# Patient Record
Sex: FEMALE | Race: BLACK OR AFRICAN AMERICAN | Employment: UNEMPLOYED | URBAN - NONMETROPOLITAN AREA
[De-identification: names, ages, dates, MRNs, and addresses within clinical notes are randomized per-mention and may not be internally consistent; named-entity substitution may affect disease eponyms.]

---

## 2021-08-05 ENCOUNTER — HOSPITAL ENCOUNTER (OUTPATIENT)
Dept: PHYSICAL THERAPY | Age: 26
Setting detail: THERAPIES SERIES
Discharge: HOME | End: 2021-08-05
Payer: COMMERCIAL

## 2021-08-05 PROCEDURE — 97110 THERAPEUTIC EXERCISES: CPT

## 2021-08-05 PROCEDURE — 97162 PT EVAL MOD COMPLEX 30 MIN: CPT

## 2021-08-05 ASSESSMENT — PAIN DESCRIPTION - LOCATION: LOCATION: BACK

## 2021-08-05 ASSESSMENT — PAIN DESCRIPTION - PAIN TYPE: TYPE: CHRONIC PAIN

## 2021-08-05 ASSESSMENT — PAIN DESCRIPTION - ORIENTATION: ORIENTATION: MID;LOWER;UPPER

## 2021-08-05 ASSESSMENT — PAIN DESCRIPTION - PROGRESSION: CLINICAL_PROGRESSION: NOT CHANGED

## 2021-08-05 ASSESSMENT — PAIN DESCRIPTION - FREQUENCY: FREQUENCY: CONTINUOUS

## 2021-08-05 ASSESSMENT — PAIN - FUNCTIONAL ASSESSMENT: PAIN_FUNCTIONAL_ASSESSMENT: ACTIVITIES ARE NOT PREVENTED

## 2021-08-05 ASSESSMENT — PAIN SCALES - GENERAL: PAINLEVEL_OUTOF10: 4

## 2021-08-05 NOTE — PLAN OF CARE
Outpatient Physical Therapy           [place]           [] Phone: [phone]   Fax: [phone]  [place]           [x] Phone: [phone]   Fax: [phone]     To: Referring Practitioner: Gladis Kam    From: Ricardo Fiore PT, PT     Patient: Natacha Stout       : 1995  Diagnosis: Diagnosis: Back pain   Treatment Diagnosis: Treatment Diagnosis: chronic back pain   Date: 2021    Physical Therapy Certification/Re-Certification Form    The following patient has been evaluated for physical therapy services and for therapy to continue, insurance requires physician review of the treatment plan initially and every 90 days. Please review the attached evaluation and/or summary of the patient's plan of care, and verify that you agree therapy should continue by signing the attached document and sending it back to our office. Assessment:    Patient primary complaints: Chronic back pain  History of condition:ongoing mid/low back pain for past 2 years; works in [employer] - pain @ end of day working- able to sleep well - sleeps on sides;  Current functional limitations:  difficulty with standing long periods; difficulty with bending over; pain decreases with tylenol, sitting down  Clinical findings:10 STS in 30 sec- reported knee pain/ Mod Oswestry 8/50;LLE General PROM: hip ROM limited by LBP; Lumbar: FB mid shin- hamstring tightness, curve does not reverse; RSB distal thigh - pulls on L side; L SB to proximal knee pulls on R side; BB full ROM   Strength RLE: WFL R Hip Extension: 4/5 (painful); Strength LLE: WFL L Hip Extension: 4/5 (painful);   Special Tests: L slump test painful in L leg/low back  PLOF:back pain has impacted on function for past 2 years  Skilled PT interventions are intended to:decrease LBP and establish to prevent pain flare ups which will enable patient  to improve quality of life  Patient agrees with established plan of care and assisted in the development of their goals  Barriers to learning:none- no mental/cognitive barriers observed  Preferred learning style(s):   written- demonstration -practice  Preferred Language: English  Potential barriers to progress:none  The patient appears motivated to participate in PT : yes  Plan of Care/Treatment to date:  [x] Therapeutic Exercise  [] Modalities:  [x] Therapeutic Activity     [] Ultrasound  [] Electrical Stimulation  [] Gait Training      [] Cervical Traction [] Lumbar Traction  [x] Neuromuscular Re-education    [] Cold/hotpack [] Iontophoresis   [x] Instruction in HEP      [] Vasopneumatic    [] Dry Needling  [x] Manual Therapy               [] Aquatic Therapy     Frequency/Duration:  # Days per week: [x] 1 day # Weeks: [] 1 week [] 5 weeks     [x] 2 days   [] 2 weeks [x] 6 weeks     [] 3 days   [] 3 weeks [] 7 weeks     [] 4 days   [] 4 weeks [] 8 weeks         [] 9 weeks [] 10 weeks         [] 11 weeks [] 12 weeks  Rehab Potential/Progress: [] Excellent [x] Good [] Fair  [] Poor   Goals:    Patient goals : less back pain     Long term goals  Time Frame for Long term goals : 6 weeks 9/15/21  Long term goal 1: patient will score 3/50 on Mod Oswestry  Long term goal 2: patient will be independent with HEP  Electronically signed by:  Ricardo Fiore, PT, PT, 8/5/2021, 3:17 PM  If you have any questions or concerns, please don't hesitate to call.   Thank you for your referral.      Physician Signature:________________________________Date:_________ TIME: _____  By signing above, therapists plan is approved by physician

## 2021-08-05 NOTE — PROGRESS NOTES
Physical Therapy  Initial Assessment  Date: 2021  Patient Name: Jose A Silva  MRN: [id number]  : 1995     Treatment Diagnosis: chronic back pain    Restrictions  Position Activity Restriction  Other position/activity restrictions: none    Subjective   General  Chart Reviewed: Yes  Patient assessed for rehabilitation services?: Yes  Additional Pertinent Hx: herniated lumbar disc ~ 9 years ago; L knee inflammation  Family / Caregiver Present: No  Referring Practitioner: Smiley Mclean  Diagnosis: Back pain  Follows Commands: Within Functional Limits  PT Visit Information  PT Insurance Information: [employer] - needs pre cert  Subjective  Subjective: ongoing mid/low back pain for past 2 years; works in [employer] - pain @ end of day working- able to sleep well - sleeps on sides; difficulty with standing long periods; difficulty with bending over; pain decreases with tylenol, sitting down  Pain Screening  Patient Currently in Pain: Yes  Pain Assessment  Pain Assessment: 0-10  Pain Level: 4 (max pain 7/10)  Pain Type: Chronic pain  Pain Location: Back  Pain Orientation: Mid;Lower; Upper  Pain Frequency: Continuous  Clinical Progression: Not changed  Functional Pain Assessment: Activities are not prevented  Vital Signs  Patient Currently in Pain: Yes    Vision/Hearing  Vision  Vision: Within Functional Limits  Hearing  Hearing: Within functional limits    Orientation  Orientation  Overall Orientation Status: Within Normal Limits    Social/Functional History  Social/Functional History  Lives With: Alone  Type of Home: Mobile home  Home Layout: One level  Home Access: Level entry  ADL Assistance: Independent  Homemaking Assistance: Independent  Homemaking Responsibilities: Yes  Ambulation Assistance: Independent  Transfer Assistance: Independent  Active : Yes  Mode of Transportation: Car  Occupation: Full time employment  Type of occupation: lawn mowing    Objective          AROM RLE (degrees)  RLE AROM: WFL  PROM LLE (degrees)  LLE PROM: WFL  LLE General PROM: hip ROM limited by LBP  Spine  Lumbar: FB mid shin- hamstring tightness, curve does not reverse; RSB distal thigh - pulls on L side; L SB to proximal knee pulls on R side; BB full ROM    Strength RLE  Strength RLE: WFL  R Hip Extension: 4/5 (painful)  Strength LLE  Strength LLE: WFL  L Hip Extension: 4/5 (painful)     Additional Measures  Special Tests: L slump test painful in L leg/low back                                             Assessment   Conditions Requiring Skilled Therapeutic Intervention  Body structures, Functions, Activity limitations: Increased pain;Decreased high-level IADLs  Assessment: 10 STS in 30 sec- reported knee pain/ Mod Oswestry 8/50  Treatment Diagnosis: chronic back pain  Prognosis: Good  Decision Making: Medium Complexity  History: PF- chronic back pain  Exam: STS/ROM/ Mod Oswestry  Clinical Presentation: changing characteristics of function due to pain  Barriers to Learning: none  REQUIRES PT FOLLOW UP: Yes         Plan        G-Code       OutComes Score                                                  AM-PAC Score             Goals  Long term goals  Time Frame for Long term goals : 6 weeks 9/15/21  Long term goal 1: patient will score 3/50 on Mod Oswestry  Long term goal 2: patient will be independent with HEP  Patient Goals   Patient goals : less back pain       Therapy Time   Individual Concurrent Group Co-treatment   Time In 0945         Time Out 1030         Minutes 45         Timed Code Treatment Minutes: 23 Minutes       SALLY Michael, PT

## 2021-08-05 NOTE — FLOWSHEET NOTE
Outpatient Physical Therapy  [place]           [] Phone: [phone]   Fax: [phone]  [place]           [x] Phone: [phone]   Fax: [phone]        Physical Therapy Daily Treatment Note  Date:  2021    Patient Name:  Sabiha Mcdonald    :  1995  MRN: [id number]  Restrictions/Precautions: Other position/activity restrictions: none  Diagnosis:   Diagnosis: Back pain  Date of Injury/Surgery:   Treatment Diagnosis: Treatment Diagnosis: chronic back pain    Insurance/Certification information: PT Insurance Information: [employer] - needs pre cert   Referring Physician:  Referring Practitioner: Becky Sigala  Next Doctor Visit:    Plan of care signed (Y/N):    Outcome Measure: 10 STS in 30 sec- reported knee pain/ Mod Oswestry 8/50  Visit# / total visits:  1 /  Pain level: 4/10  Back pain @ eval   Goals:     Patient goals : less back pain     Long term goals  Time Frame for Long term goals : 6 weeks 9/15/21  Long term goal 1: patient will score 3/50 on Mod Oswestry  Long term goal 2: patient will be independent with HEP      ASSESSMENT  Patient primary complaints: Chronic back pain  History of condition:ongoing mid/low back pain for past 2 years; works in [employer] - pain @ end of day working- able to sleep well - sleeps on sides;  Current functional limitations:  difficulty with standing long periods; difficulty with bending over; pain decreases with tylenol, sitting down  Clinical findings:10 STS in 30 sec- reported knee pain/ Mod Oswestry 8/50;LLE General PROM: hip ROM limited by LBP; Lumbar: FB mid shin- hamstring tightness, curve does not reverse; RSB distal thigh - pulls on L side; L SB to proximal knee pulls on R side; BB full ROM   Strength RLE: WFL R Hip Extension: 4/5 (painful); Strength LLE: WFL L Hip Extension: 4/5 (painful); Special Tests: L slump test painful in L leg/low back  PLOF:back pain has impacted on function for past 2 years  Skilled PT interventions are intended to:decrease LBP and establish to prevent pain flare ups which will enable patient  to improve quality of life  Patient agrees with established plan of care and assisted in the development of their  goals  Barriers to learning:none- no mental/cognitive barriers observed  Preferred learning style(s):   written- demonstration -practice  Preferred Language: English  Potential barriers to progress:none  The patient appears motivated to participate in PT : yes      Subjective:  See eval         Any changes in Ambulatory Summary Sheet? None        Objective:  See eval   COVID screening questions were asked and patient attested that there had been no contact or symptoms        Exercises: (No more than 4 columns)   Exercise/Equipment Date 8/5/21 Date Date      PT eval/ HEP instruct     WARM UP         nustep Seat 10/ arms 10 load 4 x10'           TABLE      hooklying trunk ROT x10 reps     Side ABD R/L x10 reps     Fig 4 piriformis stretch 30 ct x 3 R/L     Bridges  x10 reps              STANDING      LAE/lat rows  Trial     FM walkouts  10# x8 reps R/L                                        PROPRIOCEPTION                                    MODALITIES                      Other Therapeutic Activities/Education:        Home Exercise Program:  Trunk ROT, side ABD, fig 4 piriformis stretch, bridges- patient demonstrated and expressed understanding- provided with handout      Manual Treatments:        Modalities:        Communication with other providers:        Assessment:  (Response towards treatment session) (Pain Rating)    Pt tolerated  treatment without any adverse reactions or complications this date.  . Pt would continue to benefit from skilled therapy interventions to address remaining impairments, improve mobility and strength,  and progress toward goal completion and prepare for d/c including finalizing HEP ;  .  Pain complaints after session 4/10  Back pain    Plan for Next Session:        Time In / Time Out:   See eval              Timed Code/Total Treatment Minutes: 21' TE/ 45' includes eval      Next Progress Note due:        Plan of Care Interventions:  [x] Therapeutic Exercise  [] Modalities:  [x] Therapeutic Activity     [] Ultrasound  [] Estim  [] Gait Training      [] Cervical Traction [] Lumbar Traction  [x] Neuromuscular Re-education    [] Cold/hotpack [] Iontophoresis   [x] Instruction in HEP      [] Vasopneumatic   [] Dry Needling    [x] Manual Therapy               [] Aquatic Therapy              Electronically signed by:  Brianna Granda PT, 8/5/2021, 3:34 PM

## 2021-08-09 ENCOUNTER — APPOINTMENT (OUTPATIENT)
Dept: PHYSICAL THERAPY | Age: 26
End: 2021-08-09
Payer: COMMERCIAL

## 2021-08-11 ENCOUNTER — HOSPITAL ENCOUNTER (OUTPATIENT)
Dept: PHYSICAL THERAPY | Age: 26
Setting detail: THERAPIES SERIES
Discharge: HOME | End: 2021-08-11
Payer: COMMERCIAL

## 2021-08-11 PROCEDURE — 97110 THERAPEUTIC EXERCISES: CPT

## 2021-08-11 NOTE — FLOWSHEET NOTE
Outpatient Physical Therapy  [place]           [] Phone: [phone]   Fax: [phone]  [place]           [x] Phone: [phone]   Fax: [phone]        Physical Therapy Daily Treatment Note  Date:  2021    Patient Name:  Aiden Schultz    :  1995  MRN: [id number]  Restrictions/Precautions: Other position/activity restrictions: none  Diagnosis:   Diagnosis: Back pain  Date of Injury/Surgery:   Treatment Diagnosis: Treatment Diagnosis: chronic back pain    Insurance/Certification information: PT Insurance Information: [employer] - needs pre cert   Referring Physician:  Referring Practitioner: Real Antoni  Next Doctor Visit:    Plan of care signed (Y/N):    Outcome Measure: 10 STS in 30 sec- reported knee pain/ Mod Oswestry 8/50  Visit# / total visits:  2 /  Pain level: 6/10  Back pain and left knee    Goals:     Patient goals : less back pain     Long term goals  Time Frame for Long term goals : 6 weeks 9/15/21  Long term goal 1: patient will score 3/50 on Mod Oswestry  Long term goal 2: patient will be independent with HEP      ASSESSMENT  Patient primary complaints: Chronic back pain  History of condition:ongoing mid/low back pain for past 2 years; works in [employer] - pain @ end of day working- able to sleep well - sleeps on sides;  Current functional limitations:  difficulty with standing long periods; difficulty with bending over; pain decreases with tylenol, sitting down  Clinical findings:10 STS in 30 sec- reported knee pain/ Mod Oswestry 8/50;LLE General PROM: hip ROM limited by LBP; Lumbar: FB mid shin- hamstring tightness, curve does not reverse; RSB distal thigh - pulls on L side; L SB to proximal knee pulls on R side; BB full ROM   Strength RLE: WFL R Hip Extension: 4/5 (painful); Strength LLE: WFL L Hip Extension: 4/5 (painful); Special Tests: L slump test painful in L leg/low back  PLOF:back pain has impacted on function for past 2 years  Skilled PT interventions are intended to:decrease LBP and establish to prevent pain flare ups which will enable patient  to improve quality of life  Patient agrees with established plan of care and assisted in the development of their  goals  Barriers to learning:none- no mental/cognitive barriers observed  Preferred learning style(s):   written- demonstration -practice  Preferred Language: English  Potential barriers to progress:none  The patient appears motivated to participate in PT : yes      Subjective:  Patient states that her back is more sore today because she did a lot of lifting at work yesterday. Rates her pain 6/10. Any changes in Ambulatory Summary Sheet? None        Objective:  Poor core control noted with exercises  Patient arrived late for appointment    COVID screening questions were asked and patient attested that there had been no contact or symptoms        Exercises: (No more than 4 columns)   Exercise/Equipment Date 8/5/21 Date  8/11/2021 Date      PT eval/ HEP instruct     WARM UP         nustep Seat 10/ arms 10 load 4 x10' Seat 10/ arms 10 load 4 x10'          TABLE      hooklying trunk ROT x10 reps 10x ea     Side ABD R/L x10 reps R/L 15x    Fig 4 piriformis stretch 30 ct x 3 R/L 30\"x2 ea R/L    Bridges  x10 reps 15x              STANDING      LAE/lat rows  PTT 20x ea     FM walkouts  10# x8 reps R/L 10# 10x R/L                                       PROPRIOCEPTION                                    MODALITIES                      Other Therapeutic Activities/Education:        Home Exercise Program:  Trunk ROT, side ABD, fig 4 piriformis stretch, bridges- patient demonstrated and expressed understanding- provided with handout      Manual Treatments:        Modalities:        Communication with other providers:        Assessment:  (Response towards treatment session) (Pain Rating) Patient noted pain in the left hip at the end of treatment. Rated her pain 7/10. Progressed core strengthening exercises this visit. Plan for Next Session:        Time In / Time Out:  1100/1130       Timed Code/Total Treatment Minutes: 27' TE    If [employer] Please Indicate Units for Rx this date and running total for each and overall total for Rx to date:  CPT Code Units today Running Total Units Total approved    TE [id number]  2 2 48   MAN [id number]    36   Gait [id number]      NR [id number]   24   TA  [id number]   88   Estim Unatt [id number]       [address]      VASO [address]       ADL/Self care 20766      DNT 1-2 20560      DNT 3-4 20561      Other:                 Total for episode of care  2              Next Progress Note due:        Plan of Care Interventions:  [x] Therapeutic Exercise  [] Modalities:  [x] Therapeutic Activity     [] Ultrasound  [] Estim  [] Gait Training      [] Cervical Traction [] Lumbar Traction  [x] Neuromuscular Re-education    [] Cold/hotpack [] Iontophoresis   [x] Instruction in HEP      [] Vasopneumatic   [] Dry Needling    [x] Manual Therapy               [] Aquatic Therapy              Electronically signed by:  Jomar Her PTA  8/11/2021, 10:58 AM

## 2021-08-18 ENCOUNTER — HOSPITAL ENCOUNTER (OUTPATIENT)
Dept: PHYSICAL THERAPY | Age: 26
Setting detail: THERAPIES SERIES
Discharge: HOME | End: 2021-08-18
Payer: COMMERCIAL

## 2021-08-18 PROCEDURE — 97110 THERAPEUTIC EXERCISES: CPT

## 2021-08-18 NOTE — FLOWSHEET NOTE
Outpatient Physical Therapy  [place]           [] Phone: [phone]   Fax: [phone]  [place]           [x] Phone: [phone]   Fax: [phone]        Physical Therapy Daily Treatment Note  Date:  2021    Patient Name:  Marti Garcia    :  1995  MRN: [id number]  Restrictions/Precautions: Other position/activity restrictions: none  Diagnosis:   Diagnosis: Back pain  Date of Injury/Surgery:   Treatment Diagnosis: Treatment Diagnosis: chronic back pain    Insurance/Certification information: PT Insurance Information: [employer] - needs pre cert   Referring Physician:  Referring Practitioner: Jacklyn Cooper  Next Doctor Visit:    Plan of care signed (Y/N):    Outcome Measure: 10 STS in 30 sec- reported knee pain/ Mod Oswestry 8/50  Visit# / total visits:  3 /  Pain level: 0/10  Back pain and 6-7/10 left knee    Goals:     Patient goals : less back pain     Long term goals  Time Frame for Long term goals : 6 weeks 9/15/21  Long term goal 1: patient will score 3/50 on Mod Oswestry  Long term goal 2: patient will be independent with HEP      ASSESSMENT  Patient primary complaints: Chronic back pain  History of condition:ongoing mid/low back pain for past 2 years; works in [employer] - pain @ end of day working- able to sleep well - sleeps on sides;  Current functional limitations:  difficulty with standing long periods; difficulty with bending over; pain decreases with tylenol, sitting down  Clinical findings:10 STS in 30 sec- reported knee pain/ Mod Oswestry 8/50;LLE General PROM: hip ROM limited by LBP; Lumbar: FB mid shin- hamstring tightness, curve does not reverse; RSB distal thigh - pulls on L side; L SB to proximal knee pulls on R side; BB full ROM   Strength RLE: WFL R Hip Extension: 4/5 (painful); Strength LLE: WFL L Hip Extension: 4/5 (painful); Special Tests: L slump test painful in L leg/low back  PLOF:back pain has impacted on function for past 2 years  Skilled PT interventions are intended to:decrease LBP and establish to prevent pain flare ups which will enable patient  to improve quality of life  Patient agrees with established plan of care and assisted in the development of their  goals  Barriers to learning:none- no mental/cognitive barriers observed  Preferred learning style(s):   written- demonstration -practice  Preferred Language: English  Potential barriers to progress:none  The patient appears motivated to participate in PT : yes      Subjective:  States that she has not had any back pain in about a week. Left knee is still bothering her. Rates her knee pain 6-7/10. Any changes in Ambulatory Summary Sheet?   None        Objective: Patient demonstrated increased tightness in B HS  COVID screening questions were asked and patient attested that there had been no contact or symptoms        Exercises: (No more than 4 columns)   Exercise/Equipment Date 8/5/21 Date  8/11/2021 Date  8/18/2021      PT eval/ HEP instruct     WARM UP         nustep Seat 10/ arms 10 load 4 x10' Seat 10/ arms 10 load 4 x10' Seat 10/ arms 10 load 4 x10'         TABLE      hooklying trunk ROT x10 reps 10x ea  10x5\" ea   HS Stretch   30\"x3 B with green strap   Side ABD R/L x10 reps R/L 15x RTB 10x B   Fig 4 piriformis stretch 30 ct x 3 R/L 30\"x2 ea R/L 30\"x2 ea    Bridges  x10 reps 15x  20x3\"   Clamshells   RTB 10x    KTC with feet on ball   20x   TA contractions with Marching   GTB UE resistance 20x    TA contraction with Arms     GTB 20x   STANDING      LAE/lat rows  PTT 20x ea  PTT 20x ea    FM walkouts  10# x8 reps R/L 10# 10x R/L 10# 10x R/L                                      PROPRIOCEPTION                                    MODALITIES                      Other Therapeutic Activities/Education:        Home Exercise Program:  Trunk ROT, side ABD, fig 4 piriformis stretch, bridges- patient demonstrated and expressed understanding- provided with handout      Manual Treatments:        Modalities: Communication with other providers:        Assessment:  (Response towards treatment session) (Pain Rating) Patient rated her back and knee pain 6/10 after treatment. Progressed core strengthening and flexibility exercises today. Plan for Next Session:        Time In / Time Out:  1257/1335     Timed Code/Total Treatment Minutes: 45' TE    If [employer] Please Indicate Units for Rx this date and running total for each and overall total for Rx to date:  CPT Code Units today Running Total Units Total approved    TE [id number]  3 5 48   MAN [id number]    36   Gait 93887      NR [id number]   24   TA  57355   19   Estim Unatt [id number]       [address] [address]       ADL/Self care 70257      DNT 1-2 12615      DNT 3-4 20561      Other:                 Total for episode of care  2              Next Progress Note due:        Plan of Care Interventions:  [x] Therapeutic Exercise  [] Modalities:  [x] Therapeutic Activity     [] Ultrasound  [] Estim  [] Gait Training      [] Cervical Traction [] Lumbar Traction  [x] Neuromuscular Re-education    [] Cold/hotpack [] Iontophoresis   [x] Instruction in HEP      [] Vasopneumatic   [] Dry Needling    [x] Manual Therapy               [] Aquatic Therapy              Electronically signed by:  Carlos Azevedo PTA  8/18/2021, 12:57 PM

## 2021-08-20 ENCOUNTER — HOSPITAL ENCOUNTER (OUTPATIENT)
Dept: PHYSICAL THERAPY | Age: 26
Setting detail: THERAPIES SERIES
Discharge: HOME | End: 2021-08-20
Payer: COMMERCIAL

## 2021-08-20 PROCEDURE — 97110 THERAPEUTIC EXERCISES: CPT

## 2021-08-20 NOTE — FLOWSHEET NOTE
Outpatient Physical Therapy  [place]           [] Phone: [phone]   Fax: [phone]  [facility]           [x] Phone: [phone]   Fax: [phone]        Physical Therapy Daily Treatment Note  Date:  2021    Patient Name:  Buddy Brown    :  1995  MRN: [id number]  Restrictions/Precautions: Other position/activity restrictions: none  Diagnosis:   Diagnosis: Back pain  Date of Injury/Surgery:   Treatment Diagnosis: Treatment Diagnosis: chronic back pain    Insurance/Certification information: PT Insurance Information: [employer] - needs pre cert   Referring Physician:  Referring Practitioner: Marybeth Kumar  Next Doctor Visit:    Plan of care signed (Y/N):    Outcome Measure: 10 STS in 30 sec- reported knee pain/ Mod Oswestry 8/50  Visit# / total visits: 4 /  Pain level: 0/10  Back pain and 8/10 left knee    Goals:     Patient goals : less back pain     Long term goals  Time Frame for Long term goals : 6 weeks 9/15/21  Long term goal 1: patient will score 3/50 on Mod Oswestry  Long term goal 2: patient will be independent with HEP      ASSESSMENT  Patient primary complaints: Chronic back pain  History of condition:ongoing mid/low back pain for past 2 years; works in [employer] - pain @ end of day working- able to sleep well - sleeps on sides;  Current functional limitations:  difficulty with standing long periods; difficulty with bending over; pain decreases with tylenol, sitting down  Clinical findings:10 STS in 30 sec- reported knee pain/ Mod Oswestry 8/50;LLE General PROM: hip ROM limited by LBP; Lumbar: FB mid shin- hamstring tightness, curve does not reverse; RSB distal thigh - pulls on L side; L SB to proximal knee pulls on R side; BB full ROM   Strength RLE: WFL R Hip Extension: 4/5 (painful); Strength LLE: WFL L Hip Extension: 4/5 (painful); Special Tests: L slump test painful in L leg/low back  PLOF:back pain has impacted on function for past 2 years  Skilled PT interventions are intended to:decrease LBP and establish to prevent pain flare ups which will enable patient  to improve quality of life  Patient agrees with established plan of care and assisted in the development of their  goals  Barriers to learning:none- no mental/cognitive barriers observed  Preferred learning style(s):   written- demonstration -practice  Preferred Language: English  Potential barriers to progress:none  The patient appears motivated to participate in PT : yes      Subjective:   Patient denies back pain currently but does c/o L knee pain rating it at an 8/10. Any changes in Ambulatory Summary Sheet? None        Objective:   Increased back pain and decreased knee pain at end of session    COVID screening questions were asked and patient attested that there had been no contact or symptoms        Exercises: (No more than 4 columns)   Exercise/Equipment Date 8/5/21 Date  8/11/2021 Date  8/18/2021 8/20/2021      PT eval/ HEP instruct      WARM UP          nustep Seat 10/ arms 10 load 4 x10' Seat 10/ arms 10 load 4 x10' Seat 10/ arms 10 load 4 x10' Seat 10/ arms 10 load 4 x10'  997 steps          TABLE       hooklying trunk ROT x10 reps 10x ea  10x5\" ea 10x5\" ea   HS Stretch   30\"x3 B with green strap 30\"x3 B with green strap   Side ABD R/L x10 reps R/L 15x RTB 10x B RTB 10x B   Fig 4 piriformis stretch 30 ct x 3 R/L 30\"x2 ea R/L 30\"x2 ea  30\"x2 ea   Bridges  x10 reps 15x  20x3\" 20x3\"   Clamshells   RTB 10x  RTB 10x B   KTC with feet on ball   20x 20x   TA contractions with Marching   GTB UE resistance 20x GTB UE resistance 20x    TA contraction with Arms     GTB 20x GTB 20x   STANDING       LAE/lat rows  PTT 20x ea  PTT 20x ea  PTT 20x ea    FM walkouts  10# x8 reps R/L 10# 10x R/L 10# 10x R/L 10# 10x R/L                                           PROPRIOCEPTION                                          MODALITIES                         Other Therapeutic Activities/Education:        Home Exercise Program:  Trunk ROT, side ABD, fig 4 piriformis stretch, bridges- patient demonstrated and expressed understanding- provided with handout      Manual Treatments:        Modalities:        Communication with other providers:        Assessment:  (Response towards treatment session) (Pain Rating) Patient rated her 5/10 back and knee pain 3-4/10 after treatment. Progressed core strengthening and flexibility exercises today. Plan for Next Session:        Time In / Time Out:   1433/1511     Timed Code/Total Treatment Minutes:  38te    If [employer] Please Indicate Units for Rx this date and running total for each and overall total for Rx to date:  CPT Code Units today Running Total Units Total approved    TE 74984  9  8 69   ZQT 31595    36   Gait 95638      NR 82917   24   TA  47358   89   Estim Unatt 26243       [address] [id number]      VASO 40389       ADL/Self care 79209      DNT 1-2 05417      DNT 3-4 20561      Other:                 Total for episode of care  2              Next Progress Note due:        Plan of Care Interventions:  [x] Therapeutic Exercise  [] Modalities:  [x] Therapeutic Activity     [] Ultrasound  [] Estim  [] Gait Training      [] Cervical Traction [] Lumbar Traction  [x] Neuromuscular Re-education    [] Cold/hotpack [] Iontophoresis   [x] Instruction in HEP      [] Vasopneumatic   [] Dry Needling    [x] Manual Therapy               [] Aquatic Therapy              Electronically signed by:  Isidra Richards PTA  8/20/2021, 2:33 PM

## 2021-08-23 ENCOUNTER — HOSPITAL ENCOUNTER (OUTPATIENT)
Dept: PHYSICAL THERAPY | Age: 26
Setting detail: THERAPIES SERIES
Discharge: HOME | End: 2021-08-23
Payer: COMMERCIAL

## 2021-08-23 PROCEDURE — 97110 THERAPEUTIC EXERCISES: CPT

## 2021-08-23 NOTE — FLOWSHEET NOTE
Outpatient Physical Therapy  [place]           [] Phone: [phone]   Fax: [phone]  [place]           [x] Phone: [phone]   Fax: [phone]        Physical Therapy Daily Treatment Note  Date:  2021    Patient Name:  Buddy Brown    :  1995  MRN: [id number]  Restrictions/Precautions: Other position/activity restrictions: none  Diagnosis:   Diagnosis: Back pain  Date of Injury/Surgery:   Treatment Diagnosis: Treatment Diagnosis: chronic back pain    Insurance/Certification information: PT Insurance Information: [employer] - needs pre cert   Referring Physician:  Referring Practitioner: Marybeth Kumar  Next Doctor Visit:    Plan of care signed (Y/N):    Outcome Measure: 10 STS in 30 sec- reported knee pain/ Mod Oswestry 8/50  Visit# / total visits: 5/  Pain level: 4/10  Back pain and 5/10 left knee    Goals:     Patient goals : less back pain     Long term goals  Time Frame for Long term goals : 6 weeks 9/15/21  Long term goal 1: patient will score 3/50 on Mod Oswestry  Long term goal 2: patient will be independent with HEP      ASSESSMENT  Patient primary complaints: Chronic back pain  History of condition:ongoing mid/low back pain for past 2 years; works in [employer] - pain @ end of day working- able to sleep well - sleeps on sides;  Current functional limitations:  difficulty with standing long periods; difficulty with bending over; pain decreases with tylenol, sitting down  Clinical findings:10 STS in 30 sec- reported knee pain/ Mod Oswestry 8/50;LLE General PROM: hip ROM limited by LBP; Lumbar: FB mid shin- hamstring tightness, curve does not reverse; RSB distal thigh - pulls on L side; L SB to proximal knee pulls on R side; BB full ROM   Strength RLE: WFL R Hip Extension: 4/5 (painful); Strength LLE: WFL L Hip Extension: 4/5 (painful); Special Tests: L slump test painful in L leg/low back  PLOF:back pain has impacted on function for past 2 years  Skilled PT interventions are intended to:decrease LBP and establish to prevent pain flare ups which will enable patient  to improve quality of life  Patient agrees with established plan of care and assisted in the development of their  goals  Barriers to learning:none- no mental/cognitive barriers observed  Preferred learning style(s):   written- demonstration -practice  Preferred Language: English  Potential barriers to progress:none  The patient appears motivated to participate in PT : yes      Subjective:   Patient reporting less LBP since starting PT - notices less difficulty then bending FWD during work activities    Any changes in Ambulatory Summary Sheet?   None        Objective: no difficulty with transitional movements  COVID screening questions were asked and patient attested that there had been no contact or symptoms        Exercises: (No more than 4 columns)   Exercise/Equipment Date  8/11/2021 Date  8/18/2021 8/20/2021 8/23/21            WARM UP          nustep Seat 10/ arms 10 load 4 x10' Seat 10/ arms 10 load 4 x10' Seat 10/ arms 10 load 4 x10'  997 steps Seat 10/ arms 10 load 4 x10 944 sreps          TABLE       hooklying trunk ROT 10x ea  10x5\" ea 10x5\" ea 15x5\" ea   HS Stretch  30\"x3 B with green strap 30\"x3 B with green strap 30\"x3 B with green strap   Side ABD R/L 15x RTB 10x B RTB 10x B RTB 15x B   Fig 4 piriformis stretch 30\"x2 ea R/L 30\"x2 ea  30\"x2 ea 30\"x2 ea   Bridges  15x  20x3\" 20x3\" With band around knees 15 x 3\"   Clamshells  RTB 10x  RTB 10x B RTB 15x B  3\"   KTC with feet on ball  20x 20x stop   TA contractions with Marching  GTB UE resistance 20x GTB UE resistance 20x GTB UE resistance 20x    TA contraction with Arms    GTB 20x GTB 20x GTB 20x   STANDING       LAE/lat rows PTT 20x ea  PTT 20x ea  PTT 20x ea  PTT 20x LAE  AURELIA 29  lat rows   FM walkouts  10# 10x R/L 10# 10x R/L 10# 10x R/L 10# 10x R/L                                           PROPRIOCEPTION                                          MODALITIES Other Therapeutic Activities/Education:        Home Exercise Program:  Trunk ROT, side ABD, fig 4 piriformis stretch, bridges- patient demonstrated and expressed understanding- provided with handout      Manual Treatments:        Modalities:        Communication with other providers:        Assessment:  (Response towards treatment session) (Pain Rating) Patient rated her 5/10 back  after treatment. Progressed core strengthening  exercises today. With resistance and reps    Plan for Next Session:        Time In / Time Out:   8883/9229     Timed Code/Total Treatment Minutes:  41te    If [employer] Please Indicate Units for Rx this date and running total for each and overall total for Rx to date:  CPT Code Units today Running Total Units Total approved    TE 86664  3  11 48   MAN 97964    36   Gait 57379      NR 52040   24   TA  56082   [address]      VASO 63317       ADL/Self care 46367      DNT 1-2 26273      DNT 3-4 01950      Other:                 Total for episode of care  2              Next Progress Note due:        Plan of Care Interventions:  [x] Therapeutic Exercise  [] Modalities:  [x] Therapeutic Activity     [] Ultrasound  [] Estim  [] Gait Training      [] Cervical Traction [] Lumbar Traction  [x] Neuromuscular Re-education    [] Cold/hotpack [] Iontophoresis   [x] Instruction in HEP      [] Vasopneumatic   [] Dry Needling    [x] Manual Therapy               [] Aquatic Therapy              Electronically signed by:  Britany Lomas PT, 8/23/2021, 1:03 PM

## 2021-08-25 ENCOUNTER — HOSPITAL ENCOUNTER (OUTPATIENT)
Dept: PHYSICAL THERAPY | Age: 26
Setting detail: THERAPIES SERIES
Discharge: HOME | End: 2021-08-25
Payer: COMMERCIAL

## 2021-08-25 PROCEDURE — 97110 THERAPEUTIC EXERCISES: CPT

## 2021-08-25 NOTE — FLOWSHEET NOTE
Outpatient Physical Therapy  [place]           [] Phone: [phone]   Fax: [phone]  Mikeree[facility] Paz           [x] Phone: [phone]   Fax: [phone]        Physical Therapy Daily Treatment Note  Date:  2021    Patient Name:  Jabier Vivar    :  1995  MRN: [id number]  Restrictions/Precautions: Other position/activity restrictions: none  Diagnosis:   Diagnosis: Back pain  Date of Injury/Surgery:   Treatment Diagnosis: Treatment Diagnosis: chronic back pain    Insurance/Certification information: PT Insurance Information: [employer] - needs pre cert   Referring Physician:  Referring Practitioner: Reagan Galvez  Next Doctor Visit:    Plan of care signed (Y/N):    Outcome Measure: 10 STS in 30 sec- reported knee pain/ Mod Oswestry 8/50  Visit# / total visits: 6/  Pain level: 7/10  Back pain and3 /10 left knee-max pain in past 24 hours    Goals:     Patient goals : less back pain     Long term goals  Time Frame for Long term goals : 6 weeks 9/15/21  Long term goal 1: patient will score 3/50 on Mod Oswestry  Long term goal 2: patient will be independent with HEP      ASSESSMENT  Patient primary complaints: Chronic back pain  History of condition:ongoing mid/low back pain for past 2 years; works in [employer] - pain @ end of day working- able to sleep well - sleeps on sides;  Current functional limitations:  difficulty with standing long periods; difficulty with bending over; pain decreases with tylenol, sitting down  Clinical findings:10 STS in 30 sec- reported knee pain/ Mod Oswestry 8/50;LLE General PROM: hip ROM limited by LBP; Lumbar: FB mid shin- hamstring tightness, curve does not reverse; RSB distal thigh - pulls on L side; L SB to proximal knee pulls on R side; BB full ROM   Strength RLE: WFL R Hip Extension: 4/5 (painful); Strength LLE: WFL L Hip Extension: 4/5 (painful); Special Tests: L slump test painful in L leg/low back  PLOF:back pain has impacted on function for past 2 years  Skilled PT interventions are intended to:decrease LBP and establish to prevent pain flare ups which will enable patient  to improve quality of life  Patient agrees with established plan of care and assisted in the development of their  goals  Barriers to learning:none- no mental/cognitive barriers observed  Preferred learning style(s):   written- demonstration -practice  Preferred Language: English  Potential barriers to progress:none  The patient appears motivated to participate in PT : yes      Subjective:   Patient reporting less LBP since starting PT - reports 35% improvement since starting PT  Any changes in Ambulatory Summary Sheet?   None        Objective: 11 STS with 6-7/10  Low back  pain  COVID screening questions were asked and patient attested that there had been no contact or symptoms        Exercises: (No more than 4 columns)   Exercise/Equipment Date  8/18/2021 8/20/2021 8/23/21 8/25/21            WARM UP          nustep Seat 10/ arms 10 load 4 x10' Seat 10/ arms 10 load 4 x10'  997 steps Seat 10/ arms 10 load 4 x10 944 sreps Seat 10/ arms 10 load 5x10 1000steps          TABLE       hooklying trunk ROT 10x5\" ea 10x5\" ea 15x5\" ea 15x5\" ea   HS Stretch 30\"x3 B with green strap 30\"x3 B with green strap 30\"x3 B with green strap 30\"x3 B with green strap   Side ABD RTB 10x B RTB 10x B RTB 15x B RTB 15x B   Fig 4 piriformis stretch 30\"x2 ea  30\"x2 ea 30\"x2 ea    Bridges  20x3\" 20x3\" With band around knees 15 x 3\" With band around knees 15 x 3\"   Clamshells RTB 10x  RTB 10x B RTB 15x B  3\" RTB 15x B  3\"   KTC with feet on ball 20x 20x stop stop   TA contractions with Marching GTB UE resistance 20x GTB UE resistance 20x GTB UE resistance 20x GTB UE resistance  15 reps 3 directions    TA contraction with Arms   GTB 20x GTB 20x GTB 20x Legs on RSB   STANDING       LAE/lat rows PTT 20x ea  PTT 20x ea  PTT 20x LAE  AURELIA 29  lat rows On aeromat PTT x30 reps ea LAE and lat rows   FM walkouts  10# 10x R/L 10# 10x R/L 10# 10x R/L 10# 10x R/L   STS    11 with 6-7/10 pain                                    PROPRIOCEPTION                                          MODALITIES                         Other Therapeutic Activities/Education:        Home Exercise Program:  Trunk ROT, side ABD, fig 4 piriformis stretch, bridges- patient demonstrated and expressed understanding- provided with handout      Manual Treatments:        Modalities:        Communication with other providers:        Assessment:  (Response towards treatment session) (Pain Rating) Patient rated her 5/10 back  after treatment. Progressed core strengthening  exercises today. With addition of STS  Plan for Next Session:        Time In / Time Out:   0931/1010     Timed Code/Total Treatment Minutes:  41te    If [employer] Please Indicate Units for Rx this date and running total for each and overall total for Rx to date:  CPT Code Units today Running Total Units Total approved    TE 29031  3  14 48   MAN 30577    36   Gait 01624      NR 69415   24   TA  00033   84   Estim Unatt 46476       [address] [id number]      VASO [address]2545       ADL/Self care 58014      DNT 1-2 23355      DNT 3-4 55964      Other:                 Total for episode of care  2              Next Progress Note due:        Plan of Care Interventions:  [x] Therapeutic Exercise  [] Modalities:  [x] Therapeutic Activity     [] Ultrasound  [] Estim  [] Gait Training      [] Cervical Traction [] Lumbar Traction  [x] Neuromuscular Re-education    [] Cold/hotpack [] Iontophoresis   [x] Instruction in HEP      [] Vasopneumatic   [] Dry Needling    [x] Manual Therapy               [] Aquatic Therapy              Electronically signed by:  Ricardo Fiore PT, 8/25/2021, 9:29 AM

## 2021-09-03 ENCOUNTER — HOSPITAL ENCOUNTER (OUTPATIENT)
Dept: PHYSICAL THERAPY | Age: 26
Setting detail: THERAPIES SERIES
Discharge: HOME | End: 2021-09-03
Payer: COMMERCIAL

## 2021-09-03 PROCEDURE — 97110 THERAPEUTIC EXERCISES: CPT

## 2021-09-03 NOTE — FLOWSHEET NOTE
Outpatient Physical Therapy  [place]           [] Phone: [phone]   Fax: [phone]  [place]           [x] Phone: [phone]   Fax: [phone]        Physical Therapy Daily Treatment Note  Date:  9/3/2021    Patient Name:  Wesley Hooks    :  1995  MRN: [id number]  Restrictions/Precautions: Other position/activity restrictions: none  Diagnosis:   Diagnosis: Back pain  Date of Injury/Surgery:   Treatment Diagnosis: Treatment Diagnosis: chronic back pain    Insurance/Certification information: PT Insurance Information: [employer] - needs pre cert   Referring Physician:  Referring Practitioner: Abel Aparicio  Next Doctor Visit:    Plan of care signed (Y/N):    Outcome Measure: 10 STS in 30 sec- reported knee pain/ Mod Oswestry 8/50  Visit# / total visits: 6/  Pain level: 5/10 in the LB   Goals:     Patient goals : less back pain     Long term goals  Time Frame for Long term goals : 6 weeks 9/15/21  Long term goal 1: patient will score 3/50 on Mod Oswestry  Long term goal 2: patient will be independent with HEP      ASSESSMENT  Patient primary complaints: Chronic back pain  History of condition:ongoing mid/low back pain for past 2 years; works in [employer] - pain @ end of day working- able to sleep well - sleeps on sides;  Current functional limitations:  difficulty with standing long periods; difficulty with bending over; pain decreases with tylenol, sitting down  Clinical findings:10 STS in 30 sec- reported knee pain/ Mod Oswestry 8/50;LLE General PROM: hip ROM limited by LBP; Lumbar: FB mid shin- hamstring tightness, curve does not reverse; RSB distal thigh - pulls on L side; L SB to proximal knee pulls on R side; BB full ROM   Strength RLE: WFL R Hip Extension: 4/5 (painful); Strength LLE: WFL L Hip Extension: 4/5 (painful); Special Tests: L slump test painful in L leg/low back  PLOF:back pain has impacted on function for past 2 years  Skilled PT interventions are intended to:decrease LBP and establish to prevent pain flare ups which will enable patient  to improve quality of life  Patient agrees with established plan of care and assisted in the development of their  goals  Barriers to learning:none- no mental/cognitive barriers observed  Preferred learning style(s):   written- demonstration -practice  Preferred Language: English  Potential barriers to progress:none  The patient appears motivated to participate in PT : yes      Subjective:  Patient rates her pain 5/10 in the LB this morning. Any changes in Ambulatory Summary Sheet?   None        Objective: 13 STS   COVID screening questions were asked and patient attested that there had been no contact or symptoms        Exercises: (No more than 4 columns)   Exercise/Equipment 8/23/21 8/25/21 9/3/2021           WARM UP         nustep Seat 10/ arms 10 load 4 x10 944 sreps Seat 10/ arms 10 load 5x10 1000steps Seat 10/ arms 10 load 5x10          TABLE      hooklying trunk ROT 15x5\" ea 15x5\" ea 15x5\" ea    HS Stretch 30\"x3 B with green strap 30\"x3 B with green strap 30\"x3 B with green strap   Side ABD RTB 15x B RTB 15x B RTB 15x B    Fig 4 piriformis stretch 30\"x2 ea     Bridges  With band around knees 15 x 3\" With band around knees 15 x 3\" With band around knees 15 x 3\"   Clamshells RTB 15x B  3\" RTB 15x B  3\" RTB 15x3\"   KTC with feet on ball stop stop    TA contractions with Marching GTB UE resistance 20x GTB UE resistance  15 reps 3 directions GTB UE resistance  20 reps 3 directions    TA contraction with Arms   GTB 20x Legs on RSB Legs on RSB  GTB 20x   STANDING      LAE/lat rows PTT 20x LAE  AURELIA 29  lat rows On aeromat PTT x30 reps ea LAE and lat rows On aeromat PTT x30 reps ea LAE and lat rows   FM walkouts  10# 10x R/L 10# 10x R/L 10# 10x R/L   STS  11 with 6-7/10 pain 13 in 30\"                                PROPRIOCEPTION                                    MODALITIES                      Other Therapeutic Activities/Education:        Home

## 2021-09-10 ENCOUNTER — HOSPITAL ENCOUNTER (OUTPATIENT)
Dept: PHYSICAL THERAPY | Age: 26
Setting detail: THERAPIES SERIES
Discharge: HOME | End: 2021-09-10
Payer: COMMERCIAL

## 2021-09-10 PROCEDURE — 97110 THERAPEUTIC EXERCISES: CPT

## 2021-09-10 NOTE — FLOWSHEET NOTE
Outpatient Physical Therapy  [place]           [] Phone: [phone]   Fax: [phone]  [place]           [x] Phone: [phone]   Fax: [phone]        Physical Therapy Daily Treatment Note  Date:  9/10/2021    Patient Name:  Natacha Stout    :  1995  MRN: [id number]  Restrictions/Precautions: Other position/activity restrictions: none  Diagnosis:   Diagnosis: Back pain  Date of Injury/Surgery:   Treatment Diagnosis: Treatment Diagnosis: chronic back pain    Insurance/Certification information: PT Insurance Information: [employer] - needs pre cert   Referring Physician:  Referring Practitioner: Gladis Kam  Next Doctor Visit:    Plan of care signed (Y/N):    Outcome Measure: 10 STS in 30 sec- reported knee pain/ Mod Oswestry 8/50  Visit# / total visits: 8/  Pain level: 0/10 in the LB   Goals:     Patient goals : less back pain     Long term goals  Time Frame for Long term goals : 6 weeks 9/15/21  Long term goal 1: patient will score 3/50 on Mod Oswestry  Long term goal 2: patient will be independent with HEP      ASSESSMENT  Patient primary complaints: Chronic back pain  History of condition:ongoing mid/low back pain for past 2 years; works in [employer] - pain @ end of day working- able to sleep well - sleeps on sides;  Current functional limitations:  difficulty with standing long periods; difficulty with bending over; pain decreases with tylenol, sitting down  Clinical findings:10 STS in 30 sec- reported knee pain/ Mod Oswestry 8/50;LLE General PROM: hip ROM limited by LBP; Lumbar: FB mid shin- hamstring tightness, curve does not reverse; RSB distal thigh - pulls on L side; L SB to proximal knee pulls on R side; BB full ROM   Strength RLE: WFL R Hip Extension: 4/5 (painful); Strength LLE: WFL L Hip Extension: 4/5 (painful); Special Tests: L slump test painful in L leg/low back  PLOF:back pain has impacted on function for past 2 years  Skilled PT interventions are intended to:decrease LBP and establish to prevent pain flare ups which will enable patient  to improve quality of life  Patient agrees with established plan of care and assisted in the development of their  goals  Barriers to learning:none- no mental/cognitive barriers observed  Preferred learning style(s):   written- demonstration -practice  Preferred Language: English  Potential barriers to progress:none  The patient appears motivated to participate in PT : yes      Subjective:  Patient denies any pain in the LB this morning. Any changes in Ambulatory Summary Sheet?   None        Objective: 13 STS   COVID screening questions were asked and patient attested that there had been no contact or symptoms        Exercises: (No more than 4 columns)   Exercise/Equipment 8/25/21 9/3/2021 9/10/2021           WARM UP         nustep Seat 10/ arms 10 load 5x10 1000steps Seat 10/ arms 10 load 5x10  Seat 10/ arms 10 load 5x10         TABLE      hooklying trunk ROT 15x5\" ea 15x5\" ea  15x5\"   HS Stretch 30\"x3 B with green strap 30\"x3 B with green strap 30\"x3 with green strap   Side ABD RTB 15x B RTB 15x B  RTB 20x B   Fig 4 piriformis stretch      Bridges  With band around knees 15 x 3\" With band around knees 15 x 3\" With band around knees 20 x 3\"   Clamshells RTB 15x B  3\" RTB 15x3\" RTB 20x3\"   KTC with feet on ball stop     TA contractions with Marching GTB UE resistance  15 reps 3 directions GTB UE resistance  20 reps 3 directions GTB UE resistance  20 reps 3 directions    TA contraction with Arms   Legs on RSB Legs on RSB  GTB 20x Legs on RSB  GTB 20x   STANDING      LAE/lat rows On aeromat PTT x30 reps ea LAE and lat rows On aeromat PTT x30 reps ea LAE and lat rows On aeromat PTT x30 reps ea LAE and lat rows   FM walkouts  10# 10x R/L 10# 10x R/L 10# 10x R/L   STS 11 with 6-7/10 pain 13 in 30\" 13 in 30\"                                PROPRIOCEPTION                                    MODALITIES                      Other Therapeutic Activities/Education:        Home Exercise Program:  Trunk ROT, side ABD, fig 4 piriformis stretch, bridges- patient demonstrated and expressed understanding- provided with handout      Manual Treatments:        Modalities:        Communication with other providers:        Assessment:  (Response towards treatment session) (Pain Rating) Patient rated her back pain 8/10 after treatment, but stated that it was okay. Progressed some strengthening this visit. Plan for Next Session:        Time In / Time Out:   4263/4270  Timed Code/Total Treatment Minutes:  40 te    If [employer] Please Indicate Units for Rx this date and running total for each and overall total for Rx to date:  CPT Code Units today Running Total Units Total approved    TE 16074  7  49 72   DGS 96302    98   Gait 80768      NR 81849   24   TA  15527   [address]       ADL/Self care 110[address]      DNT 1-2 35564      DNT 3-4 42309      Other:                 Total for episode of care  2              Next Progress Note due:        Plan of Care Interventions:  [x] Therapeutic Exercise  [] Modalities:  [x] Therapeutic Activity     [] Ultrasound  [] Estim  [] Gait Training      [] Cervical Traction [] Lumbar Traction  [x] Neuromuscular Re-education    [] Cold/hotpack [] Iontophoresis   [x] Instruction in HEP      [] Vasopneumatic   [] Dry Needling    [x] Manual Therapy               [] Aquatic Therapy              Electronically signed by:  Alondra Mcdaniel PTA  9/10/2021, 8:58 AM

## 2021-09-15 ENCOUNTER — HOSPITAL ENCOUNTER (OUTPATIENT)
Dept: PHYSICAL THERAPY | Age: 26
Setting detail: THERAPIES SERIES
Discharge: HOME | End: 2021-09-15
Payer: COMMERCIAL

## 2021-09-15 PROCEDURE — 97110 THERAPEUTIC EXERCISES: CPT

## 2021-09-15 NOTE — FLOWSHEET NOTE
LBP and establish to prevent pain flare ups which will enable patient  to improve quality of life  Patient agrees with established plan of care and assisted in the development of their  goals  Barriers to learning:none- no mental/cognitive barriers observed  Preferred learning style(s):   written- demonstration -practice  Preferred Language: English  Potential barriers to progress:none  The patient appears motivated to participate in PT : yes      Subjective:  Patient denies any pain in the LB this morning; patient reports compliance with HEP. Any changes in Ambulatory Summary Sheet? None        Objective: 16 STS with  5/10 back pain;  Mod Oswestry 3/10  COVID screening questions were asked and patient attested that there had been no contact or symptoms        Exercises: (No more than 4 columns)   Exercise/Equipment 8/25/21 9/3/2021 9/10/2021 9/15/21            WARM UP          nustep Seat 10/ arms 10 load 5x10 1000steps Seat 10/ arms 10 load 5x10  Seat 10/ arms 10 load 5x10 Seat 10/ arms 10 load 5x10          TABLE       hooklying trunk ROT 15x5\" ea 15x5\" ea  15x5\" 15x5\"   HS Stretch 30\"x3 B with green strap 30\"x3 B with green strap 30\"x3 with green strap 30\"x3 with sheet   Side ABD RTB 15x B RTB 15x B  RTB 20x B RTB 20x B   Bridges  With band around knees 15 x 3\" With band around knees 15 x 3\" With band around knees 20 x 3\" With band around knees 20 x 3\"   Clamshells RTB 15x B  3\" RTB 15x3\" RTB 20x3\" RTB 20x3\"   TA contractions with Marching GTB UE resistance  15 reps 3 directions GTB UE resistance  20 reps 3 directions GTB UE resistance  20 reps 3 directions GTB UE resistance  20 reps 3 directions    TA contraction with Arms   Legs on RSB Legs on RSB  GTB 20x Legs on RSB  GTB 20x Legs on RSB  GTB 20x   STANDING       LAE/lat rows On aeromat PTT x30 reps ea LAE and lat rows On aeromat PTT x30 reps ea LAE and lat rows On aeromat PTT x30 reps ea LAE and lat rows On aeromat PTT x30 reps ea LAE and lat rows   FM walkouts  10# 10x R/L 10# 10x R/L 10# 10x R/L 10# 10x R/L   STS 11 with 6-7/10 pain 13 in 30\" 13 in 30\" 16 in 30 sec                                    PROPRIOCEPTION                                          MODALITIES                         Other Therapeutic Activities/Education:        Home Exercise Program:  Trunk ROT, side ABD, fig 4 piriformis stretch, bridges- patient demonstrated and expressed understanding- provided with handout      Manual Treatments:        Modalities:        Communication with other providers:        Assessment:  (Response towards treatment session) (Pain Rating) Patient rated her back pain 8/10 after treatment, but stated that it was okay. Progressed some strengthening this visit. Plan for Next Session:        Time In / Time Out:   0849/0929Timed Code/Total Treatment Minutes:  40 te    If [employer] Please Indicate Units for Rx this date and running total for each and overall total for Rx to date:Date range is: 08/11/2021 thur 10/29/2021. CPT Code Units today Running Total Units Total approved    TE [id number]  1  01 23   RSA 81379    88   Gait 59457      NR 40981   18   TA  64192   69   Estim Unatt [id number]       [address]       ADL/Self care 59862      DNT 1-2 45326      DNT 3-4 45375      Other:                 Total for episode of care  2              Next Progress Note due:        Plan of Care Interventions:  [x] Therapeutic Exercise  [] Modalities:  [x] Therapeutic Activity     [] Ultrasound  [] Estim  [] Gait Training      [] Cervical Traction [] Lumbar Traction  [x] Neuromuscular Re-education    [] Cold/hotpack [] Iontophoresis   [x] Instruction in HEP      [] Vasopneumatic   [] Dry Needling    [x] Manual Therapy               [] Aquatic Therapy              Electronically signed by:  Naseem Lee PT,   9/15/2021, 8:49 AM

## 2021-09-22 ENCOUNTER — HOSPITAL ENCOUNTER (OUTPATIENT)
Dept: PHYSICAL THERAPY | Age: 26
Setting detail: THERAPIES SERIES
Discharge: HOME | End: 2021-09-22
Payer: COMMERCIAL

## 2021-09-22 PROCEDURE — 97110 THERAPEUTIC EXERCISES: CPT

## 2021-09-22 NOTE — FLOWSHEET NOTE
Outpatient Physical Therapy  [place]           [] Phone: [phone]   Fax: [phone]  [place]           [x] Phone: [phone]   Fax: [phone]        Physical Therapy Daily Treatment Note  Date:  2021    Patient Name:  Valerie Brambila    :  1995  MRN: [id number]  Restrictions/Precautions: Other position/activity restrictions: none  Diagnosis:   Diagnosis: Back pain  Date of Injury/Surgery:   Treatment Diagnosis: Treatment Diagnosis: chronic back pain    Insurance/Certification information: PT Insurance Information: [employer] - needs pre cert   Referring Physician:  Referring Practitioner: Krystin Schuler  Next Doctor Visit:    Plan of care signed (Y/N):    Outcome Measure: 10 STS in 30 sec- reported knee pain/ Mod Oswestry 8/50  Visit# / total visits: 10/  Pain level: 0/10 in the LB   Goals:     Patient goals : less back pain     Long term goals  Time Frame for Long term goals : 6 weeks 9/15/21  Long term goal 1: patient will score 3/50 on Mod Oswestry  Long term goal 2: patient will be independent with HEP      ASSESSMENT  Patient primary complaints: Chronic back pain  History of condition:ongoing mid/low back pain for past 2 years; works in [employer] - pain @ end of day working- able to sleep well - sleeps on sides;  Current functional limitations:  difficulty with standing long periods; difficulty with bending over; pain decreases with tylenol, sitting down  Clinical findings:10 STS in 30 sec- reported knee pain/ Mod Oswestry 8/50;LLE General PROM: hip ROM limited by LBP; Lumbar: FB mid shin- hamstring tightness, curve does not reverse; RSB distal thigh - pulls on L side; L SB to proximal knee pulls on R side; BB full ROM   Strength RLE: WFL R Hip Extension: 4/5 (painful); Strength LLE: WFL L Hip Extension: 4/5 (painful); Special Tests: L slump test painful in L leg/low back  PLOF:back pain has impacted on function for past 2 years  Skilled PT interventions are intended to:decrease LBP and establish to prevent pain flare ups which will enable patient  to improve quality of life  Patient agrees with established plan of care and assisted in the development of their  goals  Barriers to learning:none- no mental/cognitive barriers observed  Preferred learning style(s):   written- demonstration -practice  Preferred Language: English  Potential barriers to progress:none  The patient appears motivated to participate in PT : yes      Subjective:  Patient denies any pain currently. Had some pain on Monday from playing kick ball with the kids at work. Rates that pain 8-9/10 in the LB and right thigh. Thinks the right thigh pain was from kicking the ball. Pain was gone when she woke up the next day. Any changes in Ambulatory Summary Sheet?   None        Objective: 15 STS with  5/10 back pain    COVID screening questions were asked and patient attested that there had been no contact or symptoms        Exercises: (No more than 4 columns)   Exercise/Equipment 9/10/2021 9/15/21 9/22/2021           WARM UP         nustep Seat 10/ arms 10 load 5x10 Seat 10/ arms 10 load 5x10 Seat 10/ arms 10 load 5x10         TABLE      hooklying trunk ROT 15x5\" 15x5\" 15x5\"   HS Stretch 30\"x3 with green strap 30\"x3 with sheet    Side ABD RTB 20x B RTB 20x B RTB 20x    Bridges  With band around knees 20 x 3\" With band around knees 20 x 3\" With band around knees 20 x 3\"   Clamshells RTB 20x3\" RTB 20x3\" RTB 20x3\"    TA contractions with Marching GTB UE resistance  20 reps 3 directions GTB UE resistance  20 reps 3 directions GTB UE resistance 20x 3 directions    TA contraction with Arms   Legs on RSB  GTB 20x Legs on RSB  GTB 20x Legs on RSB GTB 20x   STANDING      LAE/lat rows On aeromat PTT x30 reps ea LAE and lat rows On aeromat PTT x30 reps ea LAE and lat rows On aeromat PTT x30 reps ea LAE and lat rows   FM walkouts  10# 10x R/L 10# 10x R/L 13# 10x R/L   STS 13 in 30\" 16 in 30 sec 15 in 30 seconds PROPRIOCEPTION                                    MODALITIES                      Other Therapeutic Activities/Education:        Home Exercise Program:  Trunk ROT, side ABD, fig 4 piriformis stretch, bridges- patient demonstrated and expressed understanding- provided with handout      Manual Treatments:        Modalities:        Communication with other providers:        Assessment:  (Response towards treatment session) (Pain Rating) Patient rated her back pain 5/10 after treatment. Increased weight with resisted walking. Plan for Next Session:        Time In / Time Out:   0667/5673  Timed Code/Total Treatment Minutes:  38 te    If [employer] Please Indicate Units for Rx this date and running total for each and overall total for Rx to date:Date range is: 08/11/2021 thur 10/29/2021. CPT Code Units today Running Total Units Total approved    TE [id number]  5  70 77   HHY 70653    23   Gait 66206      NR 51675   04   TA  28466   91   Estim Unatt [id number]       [address]       ADL/Self care [address]      DNT 1-2 16408      DNT 3-4 20561      Other:                 Total for episode of care  2              Next Progress Note due:        Plan of Care Interventions:  [x] Therapeutic Exercise  [] Modalities:  [] Therapeutic Activity     [] Ultrasound  [] Estim  [] Gait Training      [] Cervical Traction [] Lumbar Traction  [] Neuromuscular Re-education    [] Cold/hotpack [] Iontophoresis   [] Instruction in HEP      [] Vasopneumatic   [] Dry Needling    [] Manual Therapy               [] Aquatic Therapy              Electronically signed by:  Leah Hudson PTA   9/22/2021, 9:11 AM

## 2021-09-23 NOTE — PROGRESS NOTES
Outpatient Physical Therapy           [place]           [] Phone: [phone]   Fax: [phone]  [place]           [x] Phone: [phone]   Fax: [phone]      To: Referring Practitioner: Hubert Guadalupe                                         From: Nick Stearns PT, PT       Patient: Matthew Piper                                                         : 1995  Diagnosis: Diagnosis: Back pain         Treatment Diagnosis: Treatment Diagnosis: chronic back pain   [x]  Progress Note                []  Discharge Note    Evaluation Date:  21   Total Visits to date:   10 Cancels/No-shows to date:    Subjective:  21:Patient denies any pain currently. Had some pain on Monday from playing kick ball with the kids at work. Rates that pain 8-9/10 in the LB and right thigh. Thinks the right thigh pain was from kicking the ball. Pain was gone when she woke up the next day. Plan of Care/Treatment to date:  [x] Therapeutic Exercise    [] Modalities:  [x] Therapeutic Activity     [] Ultrasound  [] Electrical Stimulation  [] Gait Training      [] Cervical Traction   [] Lumbar Traction  [x] Neuromuscular Re-education  [] Cold/hotpack [] Iontophoresis  [x] Instruction in HEP      Other:  [x] Manual Therapy       []  Vasopneumatic  [] Aquatic Therapy       []   Dry Needle Therapy                    Objective/Significant Findings At Last Visit/Comments: 16 STS with  5/10 back pain;  Mod Oswestry 3/50  Goal Status:  [x] Achieved [] Partially Achieved  [] Not Achieved   Long term goal 1: patient will score 3/50 on Mod Oswestry  Long term goal 2: patient will be independent with HEP   Changes to goals:    Long term goal 1: patient will score 1/50 on Mod Oswestry      Rehab Potential: [] Excellent [x] Good [] Fair  [] Poor     Patient Status: [] Continue per initial plan of Care     [] Patient now discharged     [x] Additional visits requested, Please re-certify for additional visits:      Requested frequency/duration: 1-2/week for 4weeks  If we are requesting more visits, we fully anticipate the patient's condition is expected to improve within the treatment timeframe we are requesting. Electronically signed by:  Micaela Chamberlain PT,  9/23/2021, 9:51 AM  If you have any questions or concerns, please don't hesitate to call.   Thank you for your referral.    Physician Signature:______________________ Date:______ Time: ________  By signing above, therapists plan is approved by physician

## 2021-09-29 ENCOUNTER — HOSPITAL ENCOUNTER (OUTPATIENT)
Dept: PHYSICAL THERAPY | Age: 26
Discharge: HOME | End: 2021-09-29

## 2021-09-29 NOTE — FLOWSHEET NOTE
.  Physical Therapy  Cancellation/No-show Note  Patient Name:  Nona Pierce  :  1995   Date:  2021  Cancelled visits to date: 1  No-shows to date:1    For today's appointment patient:  [x]  Cancelled  []  Rescheduled appointment  []  No-show     Reason given by patient:  []  Patient ill  []  Conflicting appointment  []  No transportation    []  Conflict with work  []  No reason given  []  Other:     Comments:      Electronically signed by:  Faby Biggs, PTA

## 2021-10-04 ENCOUNTER — HOSPITAL ENCOUNTER (OUTPATIENT)
Dept: PHYSICAL THERAPY | Age: 26
Setting detail: THERAPIES SERIES
Discharge: HOME | End: 2021-10-04
Payer: COMMERCIAL

## 2021-10-04 PROCEDURE — 97110 THERAPEUTIC EXERCISES: CPT

## 2021-10-04 NOTE — FLOWSHEET NOTE
Outpatient Physical Therapy  [place]           [] Phone: [phone]   Fax: [phone]  [place]           [x] Phone: [phone]   Fax: [phone]        Physical Therapy Daily Treatment Note  Date:  10/4/2021    Patient Name:  Tsering Corona    :  1995  MRN: [id number]  Restrictions/Precautions: Other position/activity restrictions: none  Diagnosis:   Diagnosis: Back pain  Date of Injury/Surgery:   Treatment Diagnosis: Treatment Diagnosis: chronic back pain    Insurance/Certification information: PT Insurance Information: [employer] - needs pre cert   Referring Physician:  Referring Practitioner: Janet Wall Doctor Visit:    Plan of care signed (Y/N):    Outcome Measure: 10 STS in 30 sec- reported knee pain/ Mod Oswestry 8/50  Visit# / total visits: 11/15  Pain level: 0/10 in the LB   Goals:     Patient goals : less back pain     Long term goals  Time Frame for Long term goals : Long term goal 1: patient will score 3/50 on Mod Oswestry  Long term goal 2: patient will be independent with HEP      ASSESSMENT  Patient primary complaints: Chronic back pain  History of condition:ongoing mid/low back pain for past 2 years; works in [employer] - pain @ end of day working- able to sleep well - sleeps on sides;  Current functional limitations:  difficulty with standing long periods; difficulty with bending over; pain decreases with tylenol, sitting down  Clinical findings:10 STS in 30 sec- reported knee pain/ Mod Oswestry 8/50;LLE General PROM: hip ROM limited by LBP; Lumbar: FB mid shin- hamstring tightness, curve does not reverse; RSB distal thigh - pulls on L side; L SB to proximal knee pulls on R side; BB full ROM   Strength RLE: WFL R Hip Extension: 4/5 (painful); Strength LLE: WFL L Hip Extension: 4/5 (painful); Special Tests: L slump test painful in L leg/low back  PLOF:back pain has impacted on function for past 2 years  Skilled PT interventions are intended to:decrease LBP and establish to prevent pain flare ups which will enable patient  to improve quality of life  Patient agrees with established plan of care and assisted in the development of their  goals  Barriers to learning:none- no mental/cognitive barriers observed  Preferred learning style(s):   written- demonstration -practice  Preferred Language: English  Potential barriers to progress:none  The patient appears motivated to participate in PT : yes      Subjective: Patient denies any LBP currently. Still aggravated when playing ball with the kids, but states that this pain is tolerable now. Any changes in Ambulatory Summary Sheet?   None        Objective: 16 STS     COVID screening questions were asked and patient attested that there had been no contact or symptoms        Exercises: (No more than 4 columns)   Exercise/Equipment 9/15/21 9/22/2021 10/4/2021           WARM UP         nustep Seat 10/ arms 10 load 5x10 Seat 10/ arms 10 load 5x10 Seat 10/ arms 10 load 5x10         TABLE      hooklying trunk ROT 15x5\" 15x5\" 15x5\"   HS Stretch 30\"x3 with sheet     Side ABD RTB 20x B RTB 20x  RTB 20x   Bridges  With band around knees 20 x 3\" With band around knees 20 x 3\" With band around knees 20 x 3\"   Clamshells RTB 20x3\" RTB 20x3\"  RTB 20x3\"   TA contractions with Marching GTB UE resistance  20 reps 3 directions GTB UE resistance 20x 3 directions GTB UE resistance 30x 3 directions    TA contraction with Arms   Legs on RSB  GTB 20x Legs on RSB GTB 20x Legs on RSB GTB 30x   STANDING      LAE/lat rows On aeromat PTT x30 reps ea LAE and lat rows On aeromat PTT x30 reps ea LAE and lat rows On aeromat PTT x30 reps ea LAE and lat rows   FM walkouts  10# 10x R/L 13# 10x R/L 13# 10x ea side   STS 16 in 30 sec 15 in 30 seconds 16 in 30 seconds                                PROPRIOCEPTION                                    MODALITIES                      Other Therapeutic Activities/Education:        Home Exercise Program:  Trunk ROT, side ABD, fig 4 piriformis stretch, bridges- patient demonstrated and expressed understanding- provided with handout      Manual Treatments:        Modalities:        Communication with other providers:        Assessment:  (Response towards treatment session) (Pain Rating) Patient denied any LBP at the end of treatment. Patient to schedule 1x/week for the next 3-4 weeks. Plan for Next Session:        Time In / Time Out:   1020/1100  Timed Code/Total Treatment Minutes:  40 te    If [employer] Please Indicate Units for Rx this date and running total for each and overall total for Rx to date:Date range is: 08/11/2021 thur 10/29/2021. CPT Code Units today Running Total Units Total approved    TE [id number]  5  62 77   UNX 44163    88   Gait 61073      NR 12138   83   TA  47910   29   Estim Unatt 15664       [address]       ADL/Self care [address]      DNT 1-2 36791      DNT 3-4 20561      Other:                 Total for episode of care  2              Next Progress Note due:        Plan of Care Interventions:  [x] Therapeutic Exercise  [] Modalities:  [] Therapeutic Activity     [] Ultrasound  [] Estim  [] Gait Training      [] Cervical Traction [] Lumbar Traction  [] Neuromuscular Re-education    [] Cold/hotpack [] Iontophoresis   [] Instruction in HEP      [] Vasopneumatic   [] Dry Needling    [] Manual Therapy               [] Aquatic Therapy              Electronically signed by:  ERUM Garay PT  10/4/2021, 10:21 AM

## 2021-10-12 ENCOUNTER — HOSPITAL ENCOUNTER (OUTPATIENT)
Dept: PHYSICAL THERAPY | Age: 26
Setting detail: THERAPIES SERIES
Discharge: HOME | End: 2021-10-12
Payer: COMMERCIAL

## 2021-10-12 PROCEDURE — 97110 THERAPEUTIC EXERCISES: CPT

## 2021-10-12 NOTE — FLOWSHEET NOTE
Outpatient Physical Therapy  [place]           [] Phone: [phone]   Fax: [phone]  Keisha Germain           [x] Phone: [phone]   Fax: [phone]        Physical Therapy Daily Treatment Note  Date:  10/12/2021    Patient Name:  Skyler Low    :  1995  MRN: [id number]  Restrictions/Precautions: Other position/activity restrictions: none  Diagnosis:   Diagnosis: Back pain  Date of Injury/Surgery:   Treatment Diagnosis: Treatment Diagnosis: chronic back pain    Insurance/Certification information: PT Insurance Information: [employer] - needs pre cert   Referring Physician:  Referring Practitioner: Chidi Grady  Next Doctor Visit:    Plan of care signed (Y/N):    Outcome Measure: 10 STS in 30 sec- reported knee pain/ Mod Oswestry 8/50  Visit# / total visits: 13/15  Pain level: 0/10 in the LB   Goals:     Patient goals : less back pain     Long term goals  Time Frame for Long term goals : Long term goal 1: patient will score 3/50 on Mod Oswestry  Long term goal 2: patient will be independent with HEP      ASSESSMENT  Patient primary complaints: Chronic back pain  History of condition:ongoing mid/low back pain for past 2 years; works in [employer] - pain @ end of day working- able to sleep well - sleeps on sides;  Current functional limitations:  difficulty with standing long periods; difficulty with bending over; pain decreases with tylenol, sitting down  Clinical findings:10 STS in 30 sec- reported knee pain/ Mod Oswestry 8/50;LLE General PROM: hip ROM limited by LBP; Lumbar: FB mid shin- hamstring tightness, curve does not reverse; RSB distal thigh - pulls on L side; L SB to proximal knee pulls on R side; BB full ROM   Strength RLE: WFL R Hip Extension: 4/5 (painful); Strength LLE: WFL L Hip Extension: 4/5 (painful); Special Tests: L slump test painful in L leg/low back  PLOF:back pain has impacted on function for past 2 years  Skilled PT interventions are intended to:decrease LBP and establish to prevent pain flare ups which will enable patient  to improve quality of life  Patient agrees with established plan of care and assisted in the development of their  goals  Barriers to learning:none- no mental/cognitive barriers observed  Preferred learning style(s):   written- demonstration -practice  Preferred Language: English  Potential barriers to progress:none  The patient appears motivated to participate in PT : yes      Subjective:Patient denies any LBP currently. Reports some LBP with sports activity but she can handle the pain  Any changes in Ambulatory Summary Sheet? None        Objective:  Mod Oswestry 3/5    COVID screening questions were asked and patient attested that there had been no contact or symptoms        Exercises: (No more than 4 columns)   Exercise/Equipment 9/22/2021 10/4/2021 10/11/21           WARM UP         nustep Seat 10/ arms 10 load 5x10 Seat 10/ arms 10 load 5x10 Seat 10/ arms 10 load 5x10         TABLE      hooklying trunk ROT 15x5\" 15x5\" 15x5\"   Side ABD RTB 20x  RTB 20x RTB 20x   Bridges  With band around knees 20 x 3\" With band around knees 20 x 3\" With band around knees 20 x 3\"   Clamshells RTB 20x3\"  RTB 20x3\" RTB 20x3\"   TA contractions with Marching GTB UE resistance 20x 3 directions GTB UE resistance 30x 3 directions GTB UE resistance 30x 3 directions    TA contraction with Arms   Legs on RSB GTB 20x Legs on RSB GTB 30x Legs on RSB GTB 30x   STANDING      LAE/lat rows On aeromat PTT x30 reps ea LAE and lat rows On aeromat PTT x30 reps ea LAE and lat rows On aeromat PTT x30 reps ea LAE and lat rows   FM walkouts  13# 10x R/L 13# 10x ea side 13# 10x ea side   STS 15 in 30 seconds 16 in 30 seconds 17 in 30 seconds                                PROPRIOCEPTION                                    MODALITIES                      Other Therapeutic Activities/Education:        Home Exercise Program:  Trunk ROT, side ABD, fig 4 piriformis stretch, bridges- patient

## 2024-01-22 ENCOUNTER — HOSPITAL ENCOUNTER (OUTPATIENT)
Dept: GENERAL RADIOLOGY | Age: 29
Discharge: HOME | End: 2024-01-22
Payer: COMMERCIAL

## 2024-01-22 ENCOUNTER — HOSPITAL ENCOUNTER (OUTPATIENT)
Age: 29
Discharge: HOME | End: 2024-01-22
Payer: COMMERCIAL

## 2024-01-22 DIAGNOSIS — Z98.890 OTHER SPECIFIED POSTPROCEDURAL STATES: ICD-10-CM

## 2024-01-22 DIAGNOSIS — Q52.0 CONGENITAL ABSENCE OF VAGINA: ICD-10-CM

## 2024-01-22 PROCEDURE — 70150 X-RAY EXAM OF FACIAL BONES: CPT
